# Patient Record
Sex: MALE | Race: WHITE | NOT HISPANIC OR LATINO | URBAN - NONMETROPOLITAN AREA
[De-identification: names, ages, dates, MRNs, and addresses within clinical notes are randomized per-mention and may not be internally consistent; named-entity substitution may affect disease eponyms.]

---

## 2018-06-12 ENCOUNTER — OFFICE VISIT (OUTPATIENT)
Dept: PEDIATRICS | Facility: OTHER | Age: 43
End: 2018-06-12
Payer: COMMERCIAL

## 2018-06-12 VITALS
DIASTOLIC BLOOD PRESSURE: 74 MMHG | HEART RATE: 64 BPM | HEIGHT: 71 IN | SYSTOLIC BLOOD PRESSURE: 122 MMHG | WEIGHT: 172 LBS | BODY MASS INDEX: 24.08 KG/M2

## 2018-06-12 DIAGNOSIS — R53.81 PHYSICAL DECONDITIONING: Primary | ICD-10-CM

## 2018-06-12 DIAGNOSIS — M75.101 ROTATOR CUFF SYNDROME, RIGHT: ICD-10-CM

## 2018-06-12 DIAGNOSIS — Z13.29 SCREENING FOR THYROID DISORDER: ICD-10-CM

## 2018-06-12 DIAGNOSIS — Z13.0 SCREENING, ANEMIA, DEFICIENCY, IRON: ICD-10-CM

## 2018-06-12 DIAGNOSIS — Z12.5 SCREENING FOR PROSTATE CANCER: ICD-10-CM

## 2018-06-12 DIAGNOSIS — Z23 NEED FOR VACCINATION: ICD-10-CM

## 2018-06-12 DIAGNOSIS — Z13.1 SCREENING FOR DIABETES MELLITUS: ICD-10-CM

## 2018-06-12 DIAGNOSIS — F17.200 TOBACCO USE DISORDER: ICD-10-CM

## 2018-06-12 DIAGNOSIS — R73.01 IMPAIRED FASTING GLUCOSE: ICD-10-CM

## 2018-06-12 DIAGNOSIS — Z13.220 LIPID SCREENING: ICD-10-CM

## 2018-06-12 PROBLEM — E78.2 MIXED HYPERLIPIDEMIA: Status: ACTIVE | Noted: 2018-06-12

## 2018-06-12 PROBLEM — R73.03 PRE-DIABETES: Status: ACTIVE | Noted: 2018-06-12

## 2018-06-12 LAB
ANION GAP SERPL CALCULATED.3IONS-SCNC: 8 MMOL/L (ref 3–14)
BUN SERPL-MCNC: 10 MG/DL (ref 7–25)
CALCIUM SERPL-MCNC: 9.8 MG/DL (ref 8.6–10.3)
CHLORIDE SERPL-SCNC: 102 MMOL/L (ref 98–107)
CHOLEST SERPL-MCNC: 262 MG/DL
CO2 SERPL-SCNC: 29 MMOL/L (ref 21–31)
CREAT SERPL-MCNC: 1.09 MG/DL (ref 0.7–1.3)
ERYTHROCYTE [DISTWIDTH] IN BLOOD BY AUTOMATED COUNT: 12.9 % (ref 10–15)
GFR SERPL CREATININE-BSD FRML MDRD: 73 ML/MIN/1.7M2
GLUCOSE SERPL-MCNC: 111 MG/DL (ref 70–105)
HBA1C MFR BLD: 5.8 % (ref 4–6)
HCT VFR BLD AUTO: 49.4 % (ref 40–53)
HDLC SERPL-MCNC: 58 MG/DL (ref 23–92)
HGB BLD-MCNC: 16.9 G/DL (ref 13.3–17.7)
LDLC SERPL CALC-MCNC: 172 MG/DL
MCH RBC QN AUTO: 31 PG (ref 26.5–33)
MCHC RBC AUTO-ENTMCNC: 34.2 G/DL (ref 31.5–36.5)
MCV RBC AUTO: 91 FL (ref 78–100)
NONHDLC SERPL-MCNC: 204 MG/DL
PLATELET # BLD AUTO: 225 10E9/L (ref 150–450)
POTASSIUM SERPL-SCNC: 4.7 MMOL/L (ref 3.5–5.1)
PSA SERPL-MCNC: 2.38 NG/ML
RBC # BLD AUTO: 5.45 10E12/L (ref 4.4–5.9)
SODIUM SERPL-SCNC: 139 MMOL/L (ref 134–144)
TRIGL SERPL-MCNC: 162 MG/DL
TSH SERPL DL<=0.05 MIU/L-ACNC: 1.59 IU/ML (ref 0.34–5.6)
WBC # BLD AUTO: 11.9 10E9/L (ref 4–11)

## 2018-06-12 PROCEDURE — 80061 LIPID PANEL: CPT | Performed by: INTERNAL MEDICINE

## 2018-06-12 PROCEDURE — 36415 COLL VENOUS BLD VENIPUNCTURE: CPT | Performed by: INTERNAL MEDICINE

## 2018-06-12 PROCEDURE — 99396 PREV VISIT EST AGE 40-64: CPT | Mod: 25 | Performed by: INTERNAL MEDICINE

## 2018-06-12 PROCEDURE — 85027 COMPLETE CBC AUTOMATED: CPT | Performed by: INTERNAL MEDICINE

## 2018-06-12 PROCEDURE — 84443 ASSAY THYROID STIM HORMONE: CPT | Performed by: INTERNAL MEDICINE

## 2018-06-12 PROCEDURE — 90471 IMMUNIZATION ADMIN: CPT | Performed by: INTERNAL MEDICINE

## 2018-06-12 PROCEDURE — 84153 ASSAY OF PSA TOTAL: CPT | Performed by: INTERNAL MEDICINE

## 2018-06-12 PROCEDURE — 83036 HEMOGLOBIN GLYCOSYLATED A1C: CPT | Performed by: INTERNAL MEDICINE

## 2018-06-12 PROCEDURE — 80048 BASIC METABOLIC PNL TOTAL CA: CPT | Performed by: INTERNAL MEDICINE

## 2018-06-12 PROCEDURE — 90715 TDAP VACCINE 7 YRS/> IM: CPT | Performed by: INTERNAL MEDICINE

## 2018-06-12 ASSESSMENT — PAIN SCALES - GENERAL: PAINLEVEL: NO PAIN (0)

## 2018-06-12 ASSESSMENT — ANXIETY QUESTIONNAIRES
7. FEELING AFRAID AS IF SOMETHING AWFUL MIGHT HAPPEN: NOT AT ALL
IF YOU CHECKED OFF ANY PROBLEMS ON THIS QUESTIONNAIRE, HOW DIFFICULT HAVE THESE PROBLEMS MADE IT FOR YOU TO DO YOUR WORK, TAKE CARE OF THINGS AT HOME, OR GET ALONG WITH OTHER PEOPLE: NOT DIFFICULT AT ALL
5. BEING SO RESTLESS THAT IT IS HARD TO SIT STILL: NOT AT ALL
3. WORRYING TOO MUCH ABOUT DIFFERENT THINGS: NOT AT ALL
GAD7 TOTAL SCORE: 0
2. NOT BEING ABLE TO STOP OR CONTROL WORRYING: NOT AT ALL
6. BECOMING EASILY ANNOYED OR IRRITABLE: NOT AT ALL
1. FEELING NERVOUS, ANXIOUS, OR ON EDGE: NOT AT ALL

## 2018-06-12 ASSESSMENT — PATIENT HEALTH QUESTIONNAIRE - PHQ9: 5. POOR APPETITE OR OVEREATING: NOT AT ALL

## 2018-06-12 NOTE — MR AVS SNAPSHOT
"              After Visit Summary   6/12/2018    Philip Schmitt    MRN: [id number]           Patient Information     Date Of Birth          1975        Visit Information        Provider Department      6/12/2018 8:30 AM Behzad Cobian MD [facility]        Today's Diagnoses     Physical deconditioning    -  1    Tobacco use disorder        Lipid screening        Screening, anemia, deficiency, iron        Screening for thyroid disorder        Screening for prostate cancer        Screening for diabetes mellitus        Rotator cuff syndrome, right        Need for vaccination          Care Instructions     -- Start aspirin 81 mg daily   -- Consider tobacco cessation   -- Colonscopy by age 50          Follow-ups after your visit        Who to contact     If you have questions or need follow up information about today's clinic visit or your schedule please contact [facility] directly at [phone].  Normal or non-critical lab and imaging results will be communicated to you by MyChart, letter or phone within 4 business days after the clinic has received the results. If you do not hear from us within 7 days, please contact the clinic through MyChart or phone. If you have a critical or abnormal lab result, we will notify you by phone as soon as possible.  Submit refill requests through [employer] or call your pharmacy and they will forward the refill request to us. Please allow 3 business days for your refill to be completed.          Additional Information About Your Visit        Care EveryWhere ID     This is your Care EveryWhere ID. This could be used by other organizations to access your [facility] medical records  [id number]        Your Vitals Were     Pulse Height BMI (Body Mass Index)             64 5' 11\" (1.803 m) 23.99 kg/m2          Blood Pressure from Last 3 Encounters:   06/12/18 122/74   10/08/15 126/90   09/08/15 (!) 168/108    Weight from Last 3 " Encounters:   06/12/18 172 lb (78 kg)   10/08/15 169 lb (76.7 kg)   09/08/15 167 lb 9.6 oz (76 kg)              We Performed the Following     Basic metabolic panel     CBC with platelets     Lipid Profile     Prostate Specific Antigen GH     TDAP VACCINE (BOOSTRIX)     Thyrotropin GH        Primary Care Provider Fax #    Physician No Ref-Primary [phone]       No address on file        Equal Access to Services     ROWENA MONROY : Hadii aad ku hadasho Sofroyali, waaxda luqadaha, qaybta kaalmada adeegyada, waxay idiin haykiran carlin haskinsmihaiwild hillman . So [facility] [phone].    ATENCIÓN: Si habla [place], tiene a roberson disposición servicios gratuitos de asistencia lingüística. Llame al [phone].    We comply with applicable federal civil rights laws and [place] laws. We do not discriminate on the basis of race, color, national origin, age, disability, sex, sexual orientation, or gender identity.            Thank you!     Thank you for choosing [facility] AND [facility]  for your care. Our goal is always to provide you with excellent care. Hearing back from our patients is one way we can continue to improve our services. Please take a few minutes to complete the written survey that you may receive in the mail after your visit with us. Thank you!             Your Updated Medication List - Protect others around you: Learn how to safely use, store and throw away your medicines at www.disposemymeds.org.      Notice  As of 6/12/2018  8:52 AM    You have not been prescribed any medications.

## 2018-06-12 NOTE — LETTER
June 12, 2018      Philip Schmitt  [address]   [facility] [address]        Dear ,    We are writing to inform you of your test results.    You have prediabetes.  Treatment is 5% weight loss, reduced carbohydrate intake, daily exercise.  Consider taking the prediabetes class at the [facility].  Your cholesterol has increased, but 10 year risk is still estimated at 6.5%.  No cholesterol medication is recommended at this time however work on eating more healthy, reducing intake of fried or fatty foods.  We should recheck your blood in 6 months.  Consider quitting all tobacco products.  Use of tobacco with diabetes increases a person's risk for cardiovascular disease by 20 fold.    Resulted Orders   Basic metabolic panel   Result Value Ref Range    Sodium 139 134 - 144 mmol/L    Potassium 4.7 3.5 - 5.1 mmol/L    Chloride 102 98 - 107 mmol/L    Carbon Dioxide 29 21 - 31 mmol/L    Anion Gap 8 3 - 14 mmol/L    Glucose 111 (H) 70 - 105 mg/dL    Urea Nitrogen 10 7 - 25 mg/dL    Creatinine 1.09 0.70 - 1.30 mg/dL    GFR Estimate 73 >60 mL/min/1.7m2    GFR Estimate If Black 89 >60 mL/min/1.7m2    Calcium 9.8 8.6 - 10.3 mg/dL   CBC with platelets   Result Value Ref Range    WBC 11.9 (H) 4.0 - 11.0 10e9/L    RBC Count 5.45 4.4 - 5.9 10e12/L    Hemoglobin 16.9 13.3 - 17.7 g/dL    Hematocrit 49.4 40.0 - 53.0 %    MCV 91 78 - 100 fl    MCH 31.0 26.5 - 33.0 pg    MCHC 34.2 31.5 - 36.5 g/dL    RDW 12.9 10.0 - 15.0 %    Platelet Count 225 150 - 450 10e9/L   Lipid Profile   Result Value Ref Range    Cholesterol 262 (H) <200 mg/dL    Triglycerides 162 (H) <150 mg/dL      Comment:      Borderline high:  150-199 mg/dl  High:             200-499 mg/dl  Very high:       >499 mg/dl      HDL Cholesterol 58 23 - 92 mg/dL    LDL Cholesterol Calculated 172 (H) <100 mg/dL      Comment:      Above desirable:  100-129 mg/dl  Borderline High:  130-159 mg/dL  High:             160-189 mg/dL  Very high:       >189 mg/dl      Non HDL Cholesterol 204  (H) <130 mg/dL      Comment:      Above Desirable:  130-159 mg/dl  Borderline high:  160-189 mg/dl  High:             190-219 mg/dl  Very high:       >219 mg/dl     Thyrotropin GH   Result Value Ref Range    Thyrotropin 1.59 0.34 - 5.60 IU/mL   Prostate Specific Antigen GH   Result Value Ref Range    Prostate Specific Antigen 2.380 <3.100 ng/mL   Hemoglobin A1c   Result Value Ref Range    Hemoglobin A1C 5.8 4.0 - 6.0 %       If you have any questions or concerns, please call the clinic at the number listed above.       Sincerely,        Behzad Cobian MD

## 2018-06-12 NOTE — PROGRESS NOTES
"Subjective  Philip Schmitt is a 43 year old male who presents for physical exam.  He decided he better get checked out because his dad had a triple bypass surgery 3 weeks ago.  He says he has no health problems.  He started smoking at age 3.  He continues to smoke \"only when I drink\" and uses chewing tobacco.  Has no desire to quit tobacco products.  No angina.  No constipation or diarrhea.  No concern about sexually transmitted infection.  Over the winter he had pain on the top of his right shoulder which was worse with lifting his arm vertically.  He took a couple of months to heal up but has resolved.    Problem List/PMH: reviewed in EMR, and made relevant updates today.  Medications: reviewed in EMR, and made relevant updates today.  Allergies: reviewed in EMR, and made relevant updates today.    Social Hx:  Social History   Substance Use Topics     Smoking status: Current Every Day Smoker     Packs/day: 1.00     Years: 40.00     Types: Cigarettes     Smokeless tobacco: Current User     Types: Chew      Comment: 1 pack per week, tin lasts 3-4 days     Alcohol use 0.0 oz/week      Comment: sometimes     Social History     Social History Narrative    Works in [employer].  5 children, .    Siblings - 2 brothers, 1 sister     I reviewed social history and made relevant updates today.    Family Hx:   Family History   Problem Relation Age of Onset     No Known Problems Mother      OSTEOPOROSIS Father      Osteoporosis,?     HEART DISEASE Father      CABGx3     No Known Problems Sister      No Known Problems Brother      No Known Problems Brother      DIABETES No family hx of      Deep Vein Thrombosis No family hx of        Objective  Vitals: reviewed in EMR.  /74 (BP Location: Right arm, Patient Position: Sitting, Cuff Size: Adult Large)  Pulse 64  Ht 5' 11\" (1.803 m)  Wt 172 lb (78 kg)  BMI 23.99 kg/m2    Gen: Pleasant male, NAD.  HEENT: MMM, no OP erythema.   Neck: Supple, no JVD, no bruits.  No " thyromegaly.  CV: RRR no m/r/g.   Pulm: CTAB no w/r/r  GI: Soft, NT, ND. No HSM. No masses. Bowel sounds present.  Neuro: Grossly intact  Msk: No lower extremity edema.  Skin: No concerning lesions.  Psychiatric: Normal affect and insight. Does not appear anxious or depressed.  : Normal adult circumcised male.  Testicles are descended bilaterally without masses.  No inguinal hernias.    Labs:  Lab Results   Component Value Date    HGB 15.6 09/25/2012    HCT 46.7 09/25/2012     09/25/2012    TRIG 129 10/08/2015    HDL 43 10/08/2015     10/08/2015    BUN 11 10/08/2015    CO2 29 10/08/2015         Assessment    ICD-10-CM    1. Physical deconditioning R53.81    2. Tobacco use disorder F17.200    3. Lipid screening Z13.220 Lipid Profile   4. Screening, anemia, deficiency, iron Z13.0 CBC with platelets   5. Screening for thyroid disorder Z13.29 Thyrotropin GH   6. Screening for prostate cancer Z12.5 Prostate Specific Antigen GH   7. Screening for diabetes mellitus Z13.1 Basic metabolic panel   8. Rotator cuff syndrome, right M75.101    9. Need for vaccination Z23 TDAP VACCINE (BOOSTRIX)     Orders Placed This Encounter   Procedures     TDAP VACCINE (BOOSTRIX)     Basic metabolic panel     CBC with platelets     Lipid Profile     Thyrotropin GH     Prostate Specific Antigen GH       Strongly encouraged tobacco cessation, patient declined.  His AHA/ACC 10 year cardiovascular risk is 6.4%.  Currently statin is not indicated but likely will be if cholesterol increases, or with age.    Plan   -- Expected clinical course discussed   -- Medications and their side effects discussed  Patient Instructions    -- Start aspirin 81 mg daily   -- Consider tobacco cessation   -- Colonscopy by age 48      Return in about 1 year (around 6/12/2019) for physical.    Signed, Behzad Cobian MD  Internal Medicine & Pediatrics

## 2018-06-13 ASSESSMENT — ANXIETY QUESTIONNAIRES: GAD7 TOTAL SCORE: 0

## 2018-06-13 ASSESSMENT — PATIENT HEALTH QUESTIONNAIRE - PHQ9: SUM OF ALL RESPONSES TO PHQ QUESTIONS 1-9: 0
